# Patient Record
(demographics unavailable — no encounter records)

---

## 2018-08-27 NOTE — OP
DATE OF PROCEDURE:  08/27/2018

 

PREOPERATIVE DIAGNOSIS:  Acute appendicitis.

 

POSTOPERATIVE DIAGNOSIS:  Acute appendicitis.

 

PROCEDURE:  Laparoscopic video appendectomy.

 

SURGEON:  Dr. Ted Cheatham

 

ANESTHESIA:  General, local 0.5% Marcaine with epinephrine, 30 mL.

 

DESCRIPTION OF PROCEDURE:  Patient taken to the operating room where under general anesthesia, Samuel ring placed a Broussard catheter at the beginning of this procedure and removed it at the end.  Abdomen clip
ped of hair, prepared with ChloraPrep, draped in routine fashion.  Local anesthetic infiltrated into 
skin and subcutaneous tissue about the operative site.  Infraumbilical incision made.  Pneumoperitone
um to 15 mmHg were obtained with the Veress needle, replacing it with a 5-port, video laparoscope was
 inserted.  Suprapubic incision made and a 12-port placed.  Right lateral subcostal incision made and
 a 5-port placed.  Appendix was acutely inflamed.  Mesoappendix taken down with the LigaSure to the s
tump of the appendix divided with Endo-GRISEL blue load stapler.  Stapled cecal stump was hemostatic and
 secured after placing endoclips on the staple line.  Good hemostasis ensured.  Irrigant and pneumope
ritoneum evacuated after suprapubic fascia approximated with 0 Vicryl and GraNee needle.  All skin in
cisions approximated with interrupted subdermal 4-0 Monocryl and DermaGlue applied.

## 2018-08-27 NOTE — CT
CT ABDOMEN AND PELVIS WITH CONTRAST:

 

HISTORY: 

Abdominal pain.  

 

COMPARISON: 

None.

 

FINDINGS: 

There are scattered sub-4 mm pulmonary nodules in the lung bases.  No pericardial effusion.

 

Diffuse hepatic steatosis.  The spleen is unremarkable as well as the pancreas, gallbladder, and adre
nal glands.

 

There is subtle inflammatory stranding along the appendix.  The appendix measures up to 8 mm in size.
  There is no air within the appendix.

 

Aortoiliac contour is normal.  Small volume fluid along the right pericolic gutter.

 

Left IIb lumbosacral transitional vertebrae.

 

IMPRESSION: 

Findings suggesting early appendicitis.  The appendix measures 8 mm in size with trace periappendicea
l fluid and inflammation.

 

POS: [facility]

## 2018-08-27 NOTE — HP
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  For 
two days, he has experienced right lower quadrant pain, presents to the emergency room, noted to have
 a normal white count and underwent a CAT scan demonstrating changes consistent with appendicitis con
sistent with exam with right lower quadrant tenderness.

 

ALLERGIES:  None.

 

Two beers a day.

 

PAST SURGICAL HISTORY:  Noncontributory.

 

PAST MEDICAL HISTORY:  Noncontributory.

 

REVIEW OF SYSTEMS:  Ten point noncontributory.

 

FAMILY HISTORY:  Noncontributory.

 

PHYSICAL EXAMINATION:

HEENT:  Unremarkable.

LUNGS:  Clear to auscultation.

CARDIAC:  Regular rate and rhythm without murmur or gallop.

ABDOMEN:  Soft, tenderness in his right lower quadrant, guarding, rebound.

NEUROLOGIC:  Neurologically intact.  No focal deficits.

EXTREMITIES:  Without edema.  Good pulses.

VITAL SIGNS:  92 kg, respiratory rate 17, 98.7 degrees, 137/78, and 82.

 

LABORATORY DATA:  White count 8, hemoglobin 15.  Comprehensive metabolic profile unremarkable.

 

ASSESSMENT AND PLAN:  Acute appendicitis.  Recommend laparoscopic video appendectomy.  Risk of infect
ion, bleeding, reoperation, open procedure discussed. He consents.

## 2018-08-30 NOTE — CT
CT ABDOMEN AND PELVIS WITH IV CONTRAST:

8/30/18

 

HISTORY: 

Abdominal pain. Recent surgery. 

 

COMPARISON:  

8/27/18.

 

FINDINGS:  

The lung bases are clear. The liver, spleen, kidneys, adrenal glands, and pancreas are unremarkable. 
Postoperative changes right lower quadrant with surgical absence of the appendix. Tiny amount of gas 
remains within the gallbladder lumen and at the right internal inguinal ring. Lack of oral contrast l
imits evaluation of the bowel. No evidence of obstruction. Without oral contrast, no abscess cavity i
s apparent. Moderate amount of stool within the right colon. Large amount of stool within the sigmoid
 colon and rectum. 

 

IMPRESSION:  

Postoperative changes right lower quadrant. No evidence of complication. 

 

Constipation. 

 

POS: [facility]